# Patient Record
Sex: FEMALE | Race: WHITE | NOT HISPANIC OR LATINO | Employment: PART TIME | URBAN - METROPOLITAN AREA
[De-identification: names, ages, dates, MRNs, and addresses within clinical notes are randomized per-mention and may not be internally consistent; named-entity substitution may affect disease eponyms.]

---

## 2024-01-11 ENCOUNTER — TRANSFERRED RECORDS (OUTPATIENT)
Dept: HEALTH INFORMATION MANAGEMENT | Facility: CLINIC | Age: 19
End: 2024-01-11

## 2024-02-10 ENCOUNTER — HOSPITAL ENCOUNTER (EMERGENCY)
Facility: CLINIC | Age: 19
Discharge: HOME | End: 2024-02-10
Attending: EMERGENCY MEDICINE | Admitting: EMERGENCY MEDICINE
Payer: COMMERCIAL

## 2024-02-10 VITALS
BODY MASS INDEX: 21.34 KG/M2 | OXYGEN SATURATION: 99 % | HEART RATE: 74 BPM | DIASTOLIC BLOOD PRESSURE: 75 MMHG | SYSTOLIC BLOOD PRESSURE: 132 MMHG | WEIGHT: 125 LBS | TEMPERATURE: 98.2 F | RESPIRATION RATE: 16 BRPM | HEIGHT: 64 IN

## 2024-02-10 DIAGNOSIS — R10.9 RECURRENT ABDOMINAL PAIN: ICD-10-CM

## 2024-02-10 LAB
ALBUMIN SERPL BCG-MCNC: 4.6 G/DL (ref 3.5–5.2)
ALBUMIN UR-MCNC: 10 MG/DL
ALP SERPL-CCNC: 37 U/L (ref 40–150)
ALT SERPL W P-5'-P-CCNC: 10 U/L (ref 0–50)
ANION GAP SERPL CALCULATED.3IONS-SCNC: 9 MMOL/L (ref 7–15)
APPEARANCE UR: CLEAR
AST SERPL W P-5'-P-CCNC: 16 U/L (ref 0–45)
BASOPHILS # BLD AUTO: 0 10E3/UL (ref 0–0.2)
BASOPHILS NFR BLD AUTO: 1 %
BILIRUB SERPL-MCNC: 0.6 MG/DL
BILIRUB UR QL STRIP: NEGATIVE
BUN SERPL-MCNC: 11.1 MG/DL (ref 6–20)
CALCIUM SERPL-MCNC: 9.7 MG/DL (ref 8.6–10)
CHLORIDE SERPL-SCNC: 101 MMOL/L (ref 98–107)
COLOR UR AUTO: YELLOW
CREAT SERPL-MCNC: 0.58 MG/DL (ref 0.51–0.95)
DEPRECATED HCO3 PLAS-SCNC: 26 MMOL/L (ref 22–29)
EGFRCR SERPLBLD CKD-EPI 2021: >90 ML/MIN/1.73M2
EOSINOPHIL # BLD AUTO: 0.1 10E3/UL (ref 0–0.7)
EOSINOPHIL NFR BLD AUTO: 2 %
ERYTHROCYTE [DISTWIDTH] IN BLOOD BY AUTOMATED COUNT: 12 % (ref 10–15)
GLUCOSE SERPL-MCNC: 91 MG/DL (ref 70–99)
GLUCOSE UR STRIP-MCNC: NEGATIVE MG/DL
HCG UR QL: NEGATIVE
HCT VFR BLD AUTO: 40.4 % (ref 35–47)
HGB BLD-MCNC: 13.8 G/DL (ref 11.7–15.7)
HGB UR QL STRIP: ABNORMAL
HOLD SPECIMEN: NORMAL
IMM GRANULOCYTES # BLD: 0 10E3/UL
IMM GRANULOCYTES NFR BLD: 0 %
KETONES UR STRIP-MCNC: NEGATIVE MG/DL
LEUKOCYTE ESTERASE UR QL STRIP: ABNORMAL
LYMPHOCYTES # BLD AUTO: 1.7 10E3/UL (ref 0.8–5.3)
LYMPHOCYTES NFR BLD AUTO: 30 %
MCH RBC QN AUTO: 31.1 PG (ref 26.5–33)
MCHC RBC AUTO-ENTMCNC: 34.2 G/DL (ref 31.5–36.5)
MCV RBC AUTO: 91 FL (ref 78–100)
MONOCYTES # BLD AUTO: 0.4 10E3/UL (ref 0–1.3)
MONOCYTES NFR BLD AUTO: 6 %
MUCOUS THREADS #/AREA URNS LPF: PRESENT /LPF
NEUTROPHILS # BLD AUTO: 3.5 10E3/UL (ref 1.6–8.3)
NEUTROPHILS NFR BLD AUTO: 61 %
NITRATE UR QL: NEGATIVE
NRBC # BLD AUTO: 0 10E3/UL
NRBC BLD AUTO-RTO: 0 /100
PH UR STRIP: 6 [PH] (ref 5–7)
PLATELET # BLD AUTO: 317 10E3/UL (ref 150–450)
POTASSIUM SERPL-SCNC: 4 MMOL/L (ref 3.4–5.3)
PROT SERPL-MCNC: 8.1 G/DL (ref 6.4–8.3)
RBC # BLD AUTO: 4.44 10E6/UL (ref 3.8–5.2)
RBC URINE: 7 /HPF
SODIUM SERPL-SCNC: 136 MMOL/L (ref 135–145)
SP GR UR STRIP: 1.03 (ref 1–1.03)
SQUAMOUS EPITHELIAL: 7 /HPF
UROBILINOGEN UR STRIP-MCNC: NORMAL MG/DL
WBC # BLD AUTO: 5.7 10E3/UL (ref 4–11)
WBC URINE: 14 /HPF

## 2024-02-10 PROCEDURE — 85025 COMPLETE CBC W/AUTO DIFF WBC: CPT | Performed by: EMERGENCY MEDICINE

## 2024-02-10 PROCEDURE — 96375 TX/PRO/DX INJ NEW DRUG ADDON: CPT

## 2024-02-10 PROCEDURE — 250N000013 HC RX MED GY IP 250 OP 250 PS 637: Performed by: EMERGENCY MEDICINE

## 2024-02-10 PROCEDURE — 36415 COLL VENOUS BLD VENIPUNCTURE: CPT | Performed by: EMERGENCY MEDICINE

## 2024-02-10 PROCEDURE — 96374 THER/PROPH/DIAG INJ IV PUSH: CPT

## 2024-02-10 PROCEDURE — 99284 EMERGENCY DEPT VISIT MOD MDM: CPT | Mod: 25

## 2024-02-10 PROCEDURE — 87086 URINE CULTURE/COLONY COUNT: CPT | Performed by: EMERGENCY MEDICINE

## 2024-02-10 PROCEDURE — 81001 URINALYSIS AUTO W/SCOPE: CPT | Performed by: EMERGENCY MEDICINE

## 2024-02-10 PROCEDURE — 80053 COMPREHEN METABOLIC PANEL: CPT | Performed by: EMERGENCY MEDICINE

## 2024-02-10 PROCEDURE — 250N000011 HC RX IP 250 OP 636: Performed by: EMERGENCY MEDICINE

## 2024-02-10 PROCEDURE — 81025 URINE PREGNANCY TEST: CPT | Performed by: EMERGENCY MEDICINE

## 2024-02-10 RX ORDER — MAGNESIUM HYDROXIDE/ALUMINUM HYDROXICE/SIMETHICONE 120; 1200; 1200 MG/30ML; MG/30ML; MG/30ML
15 SUSPENSION ORAL ONCE
Status: COMPLETED | OUTPATIENT
Start: 2024-02-10 | End: 2024-02-10

## 2024-02-10 RX ORDER — ONDANSETRON 2 MG/ML
4 INJECTION INTRAMUSCULAR; INTRAVENOUS ONCE
Status: COMPLETED | OUTPATIENT
Start: 2024-02-10 | End: 2024-02-10

## 2024-02-10 RX ORDER — KETOROLAC TROMETHAMINE 15 MG/ML
15 INJECTION, SOLUTION INTRAMUSCULAR; INTRAVENOUS ONCE
Status: COMPLETED | OUTPATIENT
Start: 2024-02-10 | End: 2024-02-10

## 2024-02-10 RX ORDER — OXYCODONE HYDROCHLORIDE 5 MG/1
5 TABLET ORAL ONCE
Status: COMPLETED | OUTPATIENT
Start: 2024-02-10 | End: 2024-02-10

## 2024-02-10 RX ORDER — PREDNISONE 20 MG/1
20 TABLET ORAL DAILY
Qty: 4 TABLET | Refills: 0 | Status: SHIPPED | OUTPATIENT
Start: 2024-02-10 | End: 2024-02-14

## 2024-02-10 RX ADMIN — OXYCODONE HYDROCHLORIDE 5 MG: 5 TABLET ORAL at 11:11

## 2024-02-10 RX ADMIN — KETOROLAC TROMETHAMINE 15 MG: 15 INJECTION, SOLUTION INTRAMUSCULAR; INTRAVENOUS at 11:11

## 2024-02-10 RX ADMIN — ONDANSETRON 4 MG: 2 INJECTION INTRAMUSCULAR; INTRAVENOUS at 10:55

## 2024-02-10 RX ADMIN — FAMOTIDINE 20 MG: 10 INJECTION, SOLUTION INTRAVENOUS at 11:01

## 2024-02-10 RX ADMIN — ALUMINUM HYDROXIDE, MAGNESIUM HYDROXIDE, AND SIMETHICONE 15 ML: 200; 200; 20 SUSPENSION ORAL at 10:55

## 2024-02-10 ASSESSMENT — ACTIVITIES OF DAILY LIVING (ADL)
ADLS_ACUITY_SCORE: 35
ADLS_ACUITY_SCORE: 35

## 2024-02-10 NOTE — ED PROVIDER NOTES
"  History     Chief Complaint:  Abdominal Pain       HPI   Yen Danielle is a 18 year old female presents to the emergency room with abdominal pain. The patient states that she has had flares of abdominal pain since late November. She reports that her pain can be a 3/10 and when it spies it is an 8/10. When her pain spikes it will last a few seconds to a few minuets. After a flare her stool will lose with mucus. When her pain is dull she is typically constipated. She adds when her pain is dull she also feels dizzy. She has been taking acetaminophen to help with the pain. She denies vomiting but has been close to it a few times. Her family does not have a a history of GI issues. She is waiting for a follow up and results from her endoscopy.       Independent Historian:    None    Review of External Notes:  None    Medications:    Ferrous sulfate   Microgestinfe   Proctosol   Zofran   Bentyl   Prilosec    Past Medical History:    No active problems     Past Surgical History:    Adenoidectomy  Tympanostomy tube replacement       Physical Exam   Patient Vitals for the past 24 hrs:   BP Temp Temp src Pulse Resp SpO2 Height Weight   02/10/24 0955 132/75 98.2  F (36.8  C) Oral 74 16 99 % 1.626 m (5' 4\") 56.7 kg (125 lb)        Physical Exam  Constitutional: Alert, attentive, GCS 15     Eyes: EOM are normal, anicteric, conjugate gaze  CV: distal extremities warm, well perfused  Chest: Non-labored breathing on RA  GI: Generalized tenderness. No distension. No guarding or rebound.    Neurological: Alert, attentive, moving all extremities equally.   Skin: Skin is warm and dry.      Emergency Department Course         Laboratory:  Labs Ordered and Resulted from Time of ED Arrival to Time of ED Departure   ROUTINE UA WITH MICROSCOPIC REFLEX TO CULTURE - Abnormal       Result Value    Color Urine Yellow      Appearance Urine Clear      Glucose Urine Negative      Bilirubin Urine Negative      Ketones Urine Negative      " Specific Gravity Urine 1.029      Blood Urine Trace (*)     pH Urine 6.0      Protein Albumin Urine 10 (*)     Urobilinogen Urine Normal      Nitrite Urine Negative      Leukocyte Esterase Urine Moderate (*)     Mucus Urine Present (*)     RBC Urine 7 (*)     WBC Urine 14 (*)     Squamous Epithelials Urine 7 (*)    COMPREHENSIVE METABOLIC PANEL - Abnormal    Sodium 136      Potassium 4.0      Carbon Dioxide (CO2) 26      Anion Gap 9      Urea Nitrogen 11.1      Creatinine 0.58      GFR Estimate >90      Calcium 9.7      Chloride 101      Glucose 91      Alkaline Phosphatase 37 (*)     AST 16      ALT 10      Protein Total 8.1      Albumin 4.6      Bilirubin Total 0.6     HCG QUALITATIVE URINE - Normal    hCG Urine Qualitative Negative     CBC WITH PLATELETS AND DIFFERENTIAL    WBC Count 5.7      RBC Count 4.44      Hemoglobin 13.8      Hematocrit 40.4      MCV 91      MCH 31.1      MCHC 34.2      RDW 12.0      Platelet Count 317      % Neutrophils 61      % Lymphocytes 30      % Monocytes 6      % Eosinophils 2      % Basophils 1      % Immature Granulocytes 0      NRBCs per 100 WBC 0      Absolute Neutrophils 3.5      Absolute Lymphocytes 1.7      Absolute Monocytes 0.4      Absolute Eosinophils 0.1      Absolute Basophils 0.0      Absolute Immature Granulocytes 0.0      Absolute NRBCs 0.0     URINE CULTURE        Emergency Department Course & Assessments:      Interventions:  Medications   alum & mag hydroxide-simethicone (MAALOX) suspension 15 mL (15 mLs Oral $Given 2/10/24 1055)   famotidine (PEPCID) injection 20 mg (20 mg Intravenous $Given 2/10/24 1101)   ondansetron (ZOFRAN) injection 4 mg (4 mg Intravenous $Given 2/10/24 1055)   oxyCODONE (ROXICODONE) tablet 5 mg (5 mg Oral $Given 2/10/24 1111)   ketorolac (TORADOL) injection 15 mg (15 mg Intravenous $Given 2/10/24 1111)        Assessments:  1100 I obtained history and examined the patient as noted above.   1316 I obtained history and examined the patient as  noted above.     Independent Interpretation (X-rays, CTs, rhythm strip):  None    Consultations/Discussion of Management or Tests:  None       Social Determinants of Health affecting care:  NOne     Disposition:  The patient was discharged to home.     Impression & Plan      Medical Decision Makin-year-old woman presenting for repeat evaluation of recurrent abdominal pain.  This has been well evaluated with ultrasounds, CT images, she is due to follow-up with GI as an outpatient to get the results of her biopsies.  She denies any new changes to this, denies any vaginal or urinary symptoms.  I do not see any utility in repeat imaging, screening labs are unremarkable.  With the above medicine she has did not feel significant improved.  She was interested in a trial of steroids given the biopsy showed some nonspecific inflammation, I did review with her that I will start only low dose steroids as she is due to follow-up with GI later this week for results review.  Return precautions reviewed and she was discharged.      Diagnosis:    ICD-10-CM    1. Recurrent abdominal pain  R10.9            Discharge Medications:  Discharge Medication List as of 2/10/2024  1:45 PM        START taking these medications    Details   predniSONE (DELTASONE) 20 MG tablet Take 1 tablet (20 mg) by mouth daily for 4 days, Disp-4 tablet, R-0, Local Print            Kuldeep Centeno MD  [employer]  4:20 PM 02/10/24     A gallon a may be bacteremia    Scribe Disclosure:  Cynthia GLASGOW, am serving as a scribe at 11:09 AM on 2/10/2024 to document services personally performed by Kuldeep Centeno MD based on my observations and the provider's statements to me.  2/10/2024   Kuldeep Centeno MD Dunbar, John Forrest, MD  02/10/24 7120

## 2024-02-10 NOTE — ED TRIAGE NOTES
"Has been to multiple EDs and seen GI for chronic ab pain but states not results have been conclusive.  No change in type of pain.  Feels like energy is \"being sucked to my stomach.\"      Triage Assessment (Adult)       Row Name 02/10/24 0958          Triage Assessment    Airway WDL WDL        Respiratory WDL    Respiratory WDL WDL        Skin Circulation/Temperature WDL    Skin Circulation/Temperature WDL WDL        Cardiac WDL    Cardiac WDL WDL        Peripheral/Neurovascular WDL    Peripheral Neurovascular WDL WDL        Cognitive/Neuro/Behavioral WDL    Cognitive/Neuro/Behavioral WDL WDL                     "

## 2024-02-10 NOTE — DISCHARGE INSTRUCTIONS
I would continue to take Tylenol, Pepcid or Prilosec and you can try the steroids to have associated pain.  Would follow-up with GI as you have planned.  She also underwent foot with your primary doctor for recheck.  Return here to develop worsening pain, have black tarry stools or vomit blood.

## 2024-02-11 LAB
BACTERIA UR CULT: ABNORMAL
BACTERIA UR CULT: ABNORMAL

## 2024-03-12 ENCOUNTER — TRANSFERRED RECORDS (OUTPATIENT)
Dept: HEALTH INFORMATION MANAGEMENT | Facility: CLINIC | Age: 19
End: 2024-03-12

## 2024-03-21 ENCOUNTER — HOSPITAL ENCOUNTER (OUTPATIENT)
Dept: CT IMAGING | Facility: CLINIC | Age: 19
Discharge: HOME | End: 2024-03-21
Attending: INTERNAL MEDICINE | Admitting: INTERNAL MEDICINE
Payer: COMMERCIAL

## 2024-03-21 DIAGNOSIS — R10.84 ABDOMINAL PAIN, GENERALIZED: ICD-10-CM

## 2024-03-21 DIAGNOSIS — K59.09 OTHER CONSTIPATION: ICD-10-CM

## 2024-03-21 PROCEDURE — 250N000009 HC RX 250: Performed by: INTERNAL MEDICINE

## 2024-03-21 PROCEDURE — 74177 CT ABD & PELVIS W/CONTRAST: CPT

## 2024-03-21 PROCEDURE — 250N000011 HC RX IP 250 OP 636: Performed by: INTERNAL MEDICINE

## 2024-03-21 RX ORDER — IOPAMIDOL 755 MG/ML
62 INJECTION, SOLUTION INTRAVASCULAR ONCE
Status: COMPLETED | OUTPATIENT
Start: 2024-03-21 | End: 2024-03-21

## 2024-03-21 RX ADMIN — SODIUM CHLORIDE 58 ML: 9 INJECTION, SOLUTION INTRAVENOUS at 07:47

## 2024-03-21 RX ADMIN — IOPAMIDOL 62 ML: 755 INJECTION, SOLUTION INTRAVENOUS at 07:47

## 2024-04-13 ENCOUNTER — HEALTH MAINTENANCE LETTER (OUTPATIENT)
Age: 19
End: 2024-04-13

## 2024-08-19 ENCOUNTER — MEDICAL CORRESPONDENCE (OUTPATIENT)
Dept: HEALTH INFORMATION MANAGEMENT | Facility: CLINIC | Age: 19
End: 2024-08-19

## 2024-10-24 ENCOUNTER — TRANSFERRED RECORDS (OUTPATIENT)
Dept: HEALTH INFORMATION MANAGEMENT | Facility: CLINIC | Age: 19
End: 2024-10-24
Payer: COMMERCIAL

## 2024-10-30 ENCOUNTER — TELEPHONE (OUTPATIENT)
Dept: GASTROENTEROLOGY | Facility: CLINIC | Age: 19
End: 2024-10-30
Payer: COMMERCIAL

## 2024-10-30 NOTE — TELEPHONE ENCOUNTER
Patient needs 2nd opinion (possible crohn's?) Patient sent in CHETAN to [facility] release of records on 10/28. Patient is having increased pain and is very anxious to get an appt made. Please call the patient at number in demographics.

## 2024-11-05 NOTE — TELEPHONE ENCOUNTER
PALLAVI attempted to reach out to patient to inform her of the following.      This patient does not have a referral. The patient needs a referral from her PCP or current GI provider.    Thank you,  Alannah Burleson RN  GI Care Coordinator     Patient did not answer the phone. PALLAVI left VM instructing patient to ask PCP to send referral to [facility] in regards her symptoms. That way, we can move forward with her request to schedule a visit with one of our providers. [facility] encouraged patient to call back with any questions and concerns. [facility] provided with call back number.      Gabriela You, PALLAVI

## 2024-11-06 ENCOUNTER — TRANSCRIBE ORDERS (OUTPATIENT)
Dept: OTHER | Age: 19
End: 2024-11-06

## 2024-11-06 DIAGNOSIS — K90.0 CELIAC DISEASE: Primary | ICD-10-CM

## 2024-11-06 DIAGNOSIS — R10.9 ABDOMINAL PAIN, UNSPECIFIED ABDOMINAL LOCATION: ICD-10-CM

## 2024-11-06 DIAGNOSIS — K50.919 CROHN'S DISEASE WITH COMPLICATION, UNSPECIFIED GASTROINTESTINAL TRACT LOCATION (H): ICD-10-CM

## 2024-11-06 NOTE — TELEPHONE ENCOUNTER
Received request to call patient due to confusion over referral process. Instructed patient that she would need a referral from PCP or GI provider. Once that referral is received patient will be contacted to schedule appointment.

## 2024-11-07 ENCOUNTER — TELEPHONE (OUTPATIENT)
Dept: GASTROENTEROLOGY | Facility: CLINIC | Age: 19
End: 2024-11-07
Payer: COMMERCIAL

## 2024-12-04 ENCOUNTER — MYC MEDICAL ADVICE (OUTPATIENT)
Dept: GASTROENTEROLOGY | Facility: CLINIC | Age: 19
End: 2024-12-04
Payer: COMMERCIAL

## 2024-12-09 ENCOUNTER — MEDICAL CORRESPONDENCE (OUTPATIENT)
Dept: HEALTH INFORMATION MANAGEMENT | Facility: CLINIC | Age: 19
End: 2024-12-09
Payer: COMMERCIAL

## 2024-12-10 ENCOUNTER — TRANSCRIBE ORDERS (OUTPATIENT)
Dept: OTHER | Age: 19
End: 2024-12-10

## 2024-12-10 ENCOUNTER — TRANSFERRED RECORDS (OUTPATIENT)
Dept: HEALTH INFORMATION MANAGEMENT | Facility: CLINIC | Age: 19
End: 2024-12-10
Payer: COMMERCIAL

## 2024-12-10 DIAGNOSIS — K50.90 CROHN'S DISEASE (H): Primary | ICD-10-CM

## 2024-12-23 NOTE — TELEPHONE ENCOUNTER
REFERRAL INFORMATION:  Referring Provider:  Atul Bonilla MD   Referring Clinic:  [facility]   Reason for Visit/Diagnosis: K50.90 (ICD-10-CM) - Crohn's disease (H) , Possible small bowel crohn's      FUTURE VISIT INFORMATION:  Appointment Date: 1/22/25     NOTES STATUS DETAILS   OFFICE NOTE from Referring Provider Care Everywhere 12/9/24, 10/24/24   OFFICE NOTE from Other Specialist Care Everywhere 8/19/24-Dr. Jones-[facility] DISCHARGE SUMMARY/  ED VISITS Care Everywhere ED 12/30/23-Dr. Paz-[facility]   PROCEDURES     ENDOSCOPY  Care Everywhere-[facility] 1/11/24, Capsule 3/12/24    [facility]   COLONOSCOPY Care Everywhere-[facility] 1/11/24   STOOL TESTING     LABS     PERTINENT LABS Care Everywhere    PATHOLOGY REPORTS (RELATED) Care Everywhere [facility] EGD and Colonoscopy 1/11/24   IMAGES     CT Internal 3/21/24-CT abd pel    [facility]-11/28/23-CT abdomen pelvis   ULTRASOUND In process-in PACS [facility]-1/4/24-US abdomen     Records Requested     December 23, 2024 11:52 AM  [email or web address]   Facility  Critical access hospital   Outcome Requested images

## 2025-01-22 ENCOUNTER — PRE VISIT (OUTPATIENT)
Dept: GASTROENTEROLOGY | Facility: CLINIC | Age: 20
End: 2025-01-22

## 2025-01-22 ENCOUNTER — OFFICE VISIT (OUTPATIENT)
Dept: GASTROENTEROLOGY | Facility: CLINIC | Age: 20
End: 2025-01-22
Attending: INTERNAL MEDICINE
Payer: COMMERCIAL

## 2025-01-22 VITALS
HEART RATE: 85 BPM | OXYGEN SATURATION: 96 % | HEIGHT: 64 IN | SYSTOLIC BLOOD PRESSURE: 106 MMHG | DIASTOLIC BLOOD PRESSURE: 71 MMHG | WEIGHT: 132.8 LBS | BODY MASS INDEX: 22.67 KG/M2

## 2025-01-22 DIAGNOSIS — R53.83 OTHER FATIGUE: ICD-10-CM

## 2025-01-22 DIAGNOSIS — K52.9 ILEITIS: Primary | ICD-10-CM

## 2025-01-22 RX ORDER — MESALAMINE 1.2 G/1
2400 TABLET, DELAYED RELEASE ORAL
COMMUNITY

## 2025-01-22 RX ORDER — FAMOTIDINE 10 MG
10 TABLET ORAL DAILY
COMMUNITY

## 2025-01-22 RX ORDER — NORGESTIMATE AND ETHINYL ESTRADIOL 0.25-0.035
1 KIT ORAL DAILY
COMMUNITY

## 2025-01-22 ASSESSMENT — PAIN SCALES - GENERAL: PAINLEVEL_OUTOF10: MILD PAIN (3)

## 2025-01-22 NOTE — PROGRESS NOTES
IBD CLINIC VISIT    CC/REFERRING MD:  Atul Bonilla  REASON FOR CONSULTATION: Crohn's disease and Celiac disease.       ASSESSMENT/PLAN:  Yen Danielle is a 19 year old with history of Celiac disease and abdominal pain with acute ileitis on previous colonoscopy. Presenting for second opinion visit for question of Crohn's disease.     # Question of Crohn's disease in setting of acute ileitis on TI biopsies  # Chronic abdominal pain  # Alternating bowel pattern   Patient has been dealing with GI symptoms of abdominal pain and fluctuating bowel pattern which started in late 2023. She has workup with another GI group for symptoms with largely normal workup (EGD, colon, capsule endoscopy, CT). Her colonoscopy was endoscopically normal including normal appearing ileum however biopsies showed focal acute inflammation. Capsule endoscopy was unremarkable following this.  Due to biopsy results and ongoing symptoms, she was trialed on steroids and mesalamine products with improvement in symptoms.   Today we discussed that she does not have any evidence of Crohn's disease at this time. NSAID use prior to onset of GI symptoms could explain acute ileitis. She has avoided NSAIDs since 11/2023. We will plan to evaluate with MRE and repeat colonoscopy. If these are negative, her symptoms likely fit with IBS/functional GI disorder which we discussed.    - Labs today: CBC, CMP, CRP, B12, TSH, iron studies, TTGs  - Request pathology slides for review with our pathology team.   - MR enterography to evaluate for small bowel inflammation   - Repeat colonoscopy with Dr. Mccain.   - Start fiber supplement (citrucel, metamucil, benefiber) to help even out bowel movements         Patient seen and discussed with Dr. Adán Cardoza MD   GI fellow       HPI:   19 year old with history of Celiac disease and abdominal pain with acute ileitis on previous colonoscopy. Presenting for second opinion visit for question of Crohn's disease.      Yen notes she has had ongoing issues with abdominal pain since 11/2023 which started without clear trigger. This lead to workup of with fecal calprotectin of 250 and positive fit test.  She was seen at outside GI clinic and had EGD and colonoscopy.  EGD was noted to be normal with biopsies negative for H. pylori and celiac disease.  Colonoscopy showed normal terminal ileum and colon however biopsies of terminal ileum showed acute focal inflammation.  Negative random colon biopsies.  She later presented to ED and was given prednisone for ileitis and question of Crohn's disease. Notes that her abdominal pain and loose stools resolved while on prednisone.     She has been following with outside GI group who have not given her a diagnosis of Crohn's but have tried mesalamine and budesonide for symptoms. She has capsule endoscopy in 2024 which was unremarkable. She notes feeling better on these meds, specifically Pentasa. Due to a shortage in Pentasa she is now on Lialda and not finding much benefit. She has been off of budesonide for 1-2 months.     She notes alternating stooling pattern going 3 days without a bowel movement and then will have 1 day of multiple loose stools up to 5/day.  That has 1 to 2 days of regular stools before returning to constipation.  She reports trying to focus on a high-fiber diet but has not tried any fiber supplements.    Regarding her abdominal pain she notes that initially started on her right side but over the last few months she has noted left and right sided abdominal pain as well as intermittent chest pain.  Constant dull pain on the right side of her abdomen with intermittent episodes of sharp abdominal pain in her right and left sides.  Does not seem to be associated with eating or bowel movements. No improvement with Bentyl. Also notes extreme fatigue.     Of note before she started having abdominal pain in November 2023 she was taking NSAIDs on a regular basis for headaches  that she would get a few days of the week.  On these days she would take NSAIDs every 4 hours.  She has avoided these since GI symptoms started in late 2023.          PERTINENT PAST MEDICAL HISTORY:  Celiac disease   Anxiety   Ruptured ovarian cyst at age 13    PREVIOUS SURGERIES:  None         ALLERGIES:     Allergies   Allergen Reactions    Gluten Meal GI Disturbance and Other (See Comments)     Celiac disease       PERTINENT MEDICATIONS:    Current Outpatient Medications:     famotidine (PEPCID) 10 MG tablet, Take 10 mg by mouth daily., Disp: , Rfl:     mesalamine (LIALDA) 1.2 g DR tablet, Take 2,400 mg by mouth daily (with breakfast). The patient takes from 2400 mg-3600 mg daily depending on pain condition, Disp: , Rfl:     norgestimate-ethinyl estradiol (ORTHO-CYCLEN) 0.25-35 MG-MCG tablet, Take 1 tablet by mouth daily., Disp: , Rfl:     SOCIAL HISTORY:  Social History     Socioeconomic History    Marital status: Single     Spouse name: Not on file    Number of children: Not on file    Years of education: Not on file    Highest education level: Not on file   Occupational History    Not on file   Tobacco Use    Smoking status: Never    Smokeless tobacco: Never   Substance and Sexual Activity    Alcohol use: Not on file    Drug use: Not on file    Sexual activity: Not on file   Other Topics Concern    Not on file   Social History Narrative    Not on file     Social Drivers of Health     Financial Resource Strain: Not on file   Food Insecurity: Not on file   Transportation Needs: Not on file   Physical Activity: Not on file   Stress: Not on file   Social Connections: Not on file   Interpersonal Safety: Not on file   Housing Stability: Not on file       FAMILY HISTORY:  No family history on file.    Past/family/social history reviewed and no changes    PHYSICAL EXAMINATION:  Constitutional: aaox3, cooperative, pleasant, not dyspneic/diaphoretic, no acute distress  Vitals reviewed: /71   Pulse 85   Ht 1.626  "m (5' 4\")   Wt 60.2 kg (132 lb 12.8 oz)   SpO2 96%   BMI 22.80 kg/m    Wt:   Wt Readings from Last 2 Encounters:   01/22/25 60.2 kg (132 lb 12.8 oz)   02/10/24 56.7 kg (125 lb)      Eyes: Sclera anicteric/injected  Ears/nose/mouth/throat: Normal oropharynx without ulcers or exudate, mucus membranes moist, hearing intact  CV: No edema  Respiratory: Unlabored breathing  Abd:  mild tenderness throughout, soft, and nondistended. Positive carnett's sign   Skin: warm, perfused, no jaundice  Psych: Normal affect  MSK: Normal gait      PERTINENT STUDIES:  Most recent CBC:  Recent Labs   Lab Test 02/10/24  1051   WBC 5.7   HGB 13.8   HCT 40.4        Most recent hepatic panel:  Recent Labs   Lab Test 02/10/24  1051   ALT 10   AST 16     Most recent creatinine:  Recent Labs   Lab Test 02/10/24  1051   CR 0.58         PREVIOUS ENDOSCOPY:  Colonoscopy 1/2024 for diarrhea and abdominal pain.  Normal terminal ileum and colon   -Pathology with focal acute inflammation on ileal biopsies.  Negative granulomas or dysplasia.  Normal random colon biopsies    Upper endoscopy 1/2024 normal exam of esophagus, stomach and duodenum   -Mild chronic inactive gastritis on stomach biopsies.  Negative for H. pylori.  Duodenal biopsies with normal mucosa and no increased intraepithelial lymphocytes    Capsule endoscopy 3/2024 with normal small bowel mucosa and no signs of small bowel Crohn's disease.  Small bowel passage time 3 hours 15 minutes     "

## 2025-01-22 NOTE — NURSING NOTE
"Do you have a history of colon cancer in your immediate family? NO    If yes who: negative     And what age  were they diagnosed:       Chief Complaint   Patient presents with    New Patient       Vitals:    01/22/25 0748   BP: 106/71   Pulse: 85   SpO2: 96%   Weight: 60.2 kg (132 lb 12.8 oz)   Height: 1.626 m (5' 4\")       Body mass index is 22.8 kg/m .    Izaiah Perry MA    "

## 2025-01-22 NOTE — LETTER
1/22/2025      Yen Danielle  [address] [facility] [address]      Dear Colleague,    Thank you for referring your patient, Yen Danielle, to the [facility] [place]. Please see a copy of my visit note below.    IBD CLINIC VISIT    CC/REFERRING MD:  Atul Bonilla  REASON FOR CONSULTATION: Crohn's disease and Celiac disease.       ASSESSMENT/PLAN:  Yen Danielle is a 19 year old with history of Celiac disease and abdominal pain with acute ileitis on previous colonoscopy. Presenting for second opinion visit for question of Crohn's disease.     # Question of Crohn's disease in setting of acute ileitis on TI biopsies  # Chronic abdominal pain  # Alternating bowel pattern   Patient has been dealing with GI symptoms of abdominal pain and fluctuating bowel pattern which started in late 2023. She has workup with another GI group for symptoms with largely normal workup (EGD, colon, capsule endoscopy, CT). Her colonoscopy was endoscopically normal including normal appearing ileum however biopsies showed focal acute inflammation. Capsule endoscopy was unremarkable following this.  Due to biopsy results and ongoing symptoms, she was trialed on steroids and mesalamine products with improvement in symptoms.   Today we discussed that she does not have any evidence of Crohn's disease at this time. NSAID use prior to onset of GI symptoms could explain acute ileitis. She has avoided NSAIDs since 11/2023. We will plan to evaluate with MRE and repeat colonoscopy. If these are negative, her symptoms likely fit with IBS/functional GI disorder which we discussed.    - Labs today: CBC, CMP, CRP, B12, TSH, iron studies, TTGs  - Request pathology slides for review with our pathology team.   - MR enterography to evaluate for small bowel inflammation   - Repeat colonoscopy with Dr. Mccain.   - Start fiber supplement (citrucel, metamucil, benefiber) to help even out bowel movements          Patient seen and discussed with Dr. Adán Cardoza MD   GI fellow       HPI:   19 year old with history of Celiac disease and abdominal pain with acute ileitis on previous colonoscopy. Presenting for second opinion visit for question of Crohn's disease.     Yen notes she has had ongoing issues with abdominal pain since 11/2023 which started without clear trigger. This lead to workup of with fecal calprotectin of 250 and positive fit test.  She was seen at outside GI clinic and had EGD and colonoscopy.  EGD was noted to be normal with biopsies negative for H. pylori and celiac disease.  Colonoscopy showed normal terminal ileum and colon however biopsies of terminal ileum showed acute focal inflammation.  Negative random colon biopsies.  She later presented to ED and was given prednisone for ileitis and question of Crohn's disease. Notes that her abdominal pain and loose stools resolved while on prednisone.     She has been following with outside GI group who have not given her a diagnosis of Crohn's but have tried mesalamine and budesonide for symptoms. She has capsule endoscopy in 2024 which was unremarkable. She notes feeling better on these meds, specifically Pentasa. Due to a shortage in Pentasa she is now on Lialda and not finding much benefit. She has been off of budesonide for 1-2 months.     She notes alternating stooling pattern going 3 days without a bowel movement and then will have 1 day of multiple loose stools up to 5/day.  That has 1 to 2 days of regular stools before returning to constipation.  She reports trying to focus on a high-fiber diet but has not tried any fiber supplements.    Regarding her abdominal pain she notes that initially started on her right side but over the last few months she has noted left and right sided abdominal pain as well as intermittent chest pain.  Constant dull pain on the right side of her abdomen with intermittent episodes of sharp abdominal pain  in her right and left sides.  Does not seem to be associated with eating or bowel movements. No improvement with Bentyl. Also notes extreme fatigue.     Of note before she started having abdominal pain in November 2023 she was taking NSAIDs on a regular basis for headaches that she would get a few days of the week.  On these days she would take NSAIDs every 4 hours.  She has avoided these since GI symptoms started in late 2023.          PERTINENT PAST MEDICAL HISTORY:  Celiac disease   Anxiety   Ruptured ovarian cyst at age 13    PREVIOUS SURGERIES:  None         ALLERGIES:     Allergies   Allergen Reactions     Gluten Meal GI Disturbance and Other (See Comments)     Celiac disease       PERTINENT MEDICATIONS:    Current Outpatient Medications:      famotidine (PEPCID) 10 MG tablet, Take 10 mg by mouth daily., Disp: , Rfl:      mesalamine (LIALDA) 1.2 g DR tablet, Take 2,400 mg by mouth daily (with breakfast). The patient takes from 2400 mg-3600 mg daily depending on pain condition, Disp: , Rfl:      norgestimate-ethinyl estradiol (ORTHO-CYCLEN) 0.25-35 MG-MCG tablet, Take 1 tablet by mouth daily., Disp: , Rfl:     SOCIAL HISTORY:  Social History     Socioeconomic History     Marital status: Single     Spouse name: Not on file     Number of children: Not on file     Years of education: Not on file     Highest education level: Not on file   Occupational History     Not on file   Tobacco Use     Smoking status: Never     Smokeless tobacco: Never   Substance and Sexual Activity     Alcohol use: Not on file     Drug use: Not on file     Sexual activity: Not on file   Other Topics Concern     Not on file   Social History Narrative     Not on file     Social Drivers of Health     Financial Resource Strain: Not on file   Food Insecurity: Not on file   Transportation Needs: Not on file   Physical Activity: Not on file   Stress: Not on file   Social Connections: Not on file   Interpersonal Safety: Not on file   Housing  "Stability: Not on file       FAMILY HISTORY:  No family history on file.    Past/family/social history reviewed and no changes    PHYSICAL EXAMINATION:  Constitutional: aaox3, cooperative, pleasant, not dyspneic/diaphoretic, no acute distress  Vitals reviewed: /71   Pulse 85   Ht 1.626 m (5' 4\")   Wt 60.2 kg (132 lb 12.8 oz)   SpO2 96%   BMI 22.80 kg/m    Wt:   Wt Readings from Last 2 Encounters:   01/22/25 60.2 kg (132 lb 12.8 oz)   02/10/24 56.7 kg (125 lb)      Eyes: Sclera anicteric/injected  Ears/nose/mouth/throat: Normal oropharynx without ulcers or exudate, mucus membranes moist, hearing intact  CV: No edema  Respiratory: Unlabored breathing  Abd:  mild tenderness throughout, soft, and nondistended. Positive carnett's sign   Skin: warm, perfused, no jaundice  Psych: Normal affect  MSK: Normal gait      PERTINENT STUDIES:  Most recent CBC:  Recent Labs   Lab Test 02/10/24  1051   WBC 5.7   HGB 13.8   HCT 40.4        Most recent hepatic panel:  Recent Labs   Lab Test 02/10/24  1051   ALT 10   AST 16     Most recent creatinine:  Recent Labs   Lab Test 02/10/24  1051   CR 0.58         PREVIOUS ENDOSCOPY:  Colonoscopy 1/2024 for diarrhea and abdominal pain.  Normal terminal ileum and colon   -Pathology with focal acute inflammation on ileal biopsies.  Negative granulomas or dysplasia.  Normal random colon biopsies    Upper endoscopy 1/2024 normal exam of esophagus, stomach and duodenum   -Mild chronic inactive gastritis on stomach biopsies.  Negative for H. pylori.  Duodenal biopsies with normal mucosa and no increased intraepithelial lymphocytes    Capsule endoscopy 3/2024 with normal small bowel mucosa and no signs of small bowel Crohn's disease.  Small bowel passage time 3 hours 15 minutes       Attestation signed by Maninder Mccain MD at 1/23/2025  1:15 PM:  I performed a history and physical examination of this patient and discussed the management with Moe Melgar MD on Jan " 22, 2025. I reviewed the note and there are no changes to the past medical, family or social history.  A complete 10 point review of systems was obtained. Please see the HPI for pertinent positives and negatives. All other systems were reviewed and were found to be negative. I agree with the documented findings and plan of care as outlined.    We discussed at this time that she did not have enough evidence to diagnose Crohn's disease. Isolated microscopic acute ileitis is not consistent with a diagnosis of Crohn's.     Discussed that feeling better on steroids was not a diagnostic marker of Crohn's either.    Her bloating and fatigue are what is driving her poor quality of life at this point. We discussed that there is a strong mind-body connection and that many of her symptoms may be centrally (functionally) medicated.     We will thoroughly assess for Crohn's in her with a pathology review, MRE, and repeat colonoscopy.     I did discuss if she did not have Crohn's disease, that she would do best with seeing a functional medicine or IBS specialist.     Follow-up with me based on a diagnosis of Crohn's disease or not.     Maninder Mccain MD MS    [facility]  Inflammatory Bowel Disease Program  Division of Gastroenterology, Hepatology and Nutrition       Again, thank you for allowing me to participate in the care of your patient.        Sincerely,        Maninder Mccain MD    Electronically signed

## 2025-01-22 NOTE — PATIENT INSTRUCTIONS
It was a pleasure meeting with you today and discussing your healthcare plan. Below is a summary of what we covered:    -blood work today  - MR enterography (call to schedule  [phone])  - Colonoscopy with Dr. Mccain.       Please see below for any additional questions and scheduling guidelines.    Sign up for [email or web address]: [email or web address] patient portal serves as a secure platform for accessing your medical records from the [facility]. Additionally, [email or web address] facilitates easy, timely, and secure messaging with your care team. If you have not signed up, you may do so by using the provided code or calling [phone].    Coordinating your care after your visit:  There are multiple options for scheduling your follow-up care based on your provider's recommendation.    How do I schedule a follow-up clinic appointment:   After your appointment, you may receive scheduling assistance with the Clinic Coordinators by having a seat in the waiting room and a Clinic Coordinator will call you up to schedule.  Virtual visits or after you leave the clinic:  Your provider has placed a follow-up order in the [email or web address] portal for scheduling your return appointment. A member of the scheduling team will contact you to schedule.  [facility] Scheduling: Timely scheduling through [email or web address] is advised to ensure appointment availability.   Call to schedule: You may schedule your follow-up appointment(s) by calling [phone], option 1.    How do I schedule my endoscopy or colonoscopy procedure:  If a procedure, such as a colonoscopy or upper endoscopy was ordered by your provider, the scheduling team will contact you to schedule this procedure. Or you may choose to call to schedule at   [phone], option 2.  Please allow 20-30 minutes when scheduling a procedure.    How do I get my blood work done? To get your blood work done, you need to schedule a lab appointment at an [facility]. There are multiple ways to schedule:    At the clinic: The Clinic Coordinator you meet after your visit can help you schedule a lab appointment.   [employer] scheduling: [employer] offers online lab scheduling at all [facility] laboratory locations.   Call to schedule: You can call [phone] to schedule your lab appointment.    How do I schedule my imaging study: To schedule imaging studies, such as CT scans, ultrasounds, MRIs, or X-rays, contact Imaging Services at [phone].    How do I schedule a referral to another doctor: If your provider recommended a referral to another specialist(s), the referral order was placed by your provider. You will receive a phone call to schedule this referral, or you may choose to call the number attached to the referral to self-schedule.    For Post-Visit Question(s):  For any inquiries following today's visit:  Please utilize [employer] messaging and allow 48 hours for reply or contact the Call Center during normal business hours at [phone], option 3.  For Emergent After-hours questions, contact the On-Call GI Fellow through the [facility]  at [phone].  In addition, you may contact your Nurse directly using the provided contact information.    Test Results:  Test results will be accessible via [employer] in compliance with the 21st Century Cures Act. This means that your results will be available to you at the same time as your provider. Often you may see your results before your provider does. Results are reviewed by staff within two weeks with communication follow-up. Results may be released in the patient portal prior to your care team review.    Prescription Refill(s):  Medication prescribed by your provider will be addressed during your visit. For future refills, please coordinate with your pharmacy. If you have not had a recent clinic visit or routine labs, for your safety, your provider may not be able to refill your prescription.

## 2025-01-23 ENCOUNTER — DOCUMENTATION ONLY (OUTPATIENT)
Dept: GASTROENTEROLOGY | Facility: CLINIC | Age: 20
End: 2025-01-23
Payer: COMMERCIAL

## 2025-01-23 ENCOUNTER — MYC MEDICAL ADVICE (OUTPATIENT)
Dept: GASTROENTEROLOGY | Facility: CLINIC | Age: 20
End: 2025-01-23
Payer: COMMERCIAL

## 2025-01-24 ENCOUNTER — LAB (OUTPATIENT)
Dept: LAB | Facility: CLINIC | Age: 20
End: 2025-01-24
Payer: COMMERCIAL

## 2025-01-24 DIAGNOSIS — R53.83 OTHER FATIGUE: ICD-10-CM

## 2025-01-24 DIAGNOSIS — K52.9 ILEITIS: ICD-10-CM

## 2025-01-24 LAB
ALBUMIN SERPL BCG-MCNC: 4.4 G/DL (ref 3.5–5.2)
ALP SERPL-CCNC: 50 U/L (ref 40–150)
ALT SERPL W P-5'-P-CCNC: 9 U/L (ref 0–50)
ANION GAP SERPL CALCULATED.3IONS-SCNC: 10 MMOL/L (ref 7–15)
AST SERPL W P-5'-P-CCNC: 18 U/L (ref 0–45)
BILIRUB SERPL-MCNC: 0.4 MG/DL
BUN SERPL-MCNC: 7.4 MG/DL (ref 6–20)
CALCIUM SERPL-MCNC: 9.3 MG/DL (ref 8.8–10.4)
CHLORIDE SERPL-SCNC: 101 MMOL/L (ref 98–107)
CREAT SERPL-MCNC: 0.57 MG/DL (ref 0.51–0.95)
CRP SERPL-MCNC: 3.57 MG/L
EGFRCR SERPLBLD CKD-EPI 2021: >90 ML/MIN/1.73M2
ERYTHROCYTE [DISTWIDTH] IN BLOOD BY AUTOMATED COUNT: 11.8 % (ref 10–15)
FERRITIN SERPL-MCNC: 30 NG/ML (ref 6–175)
GLUCOSE SERPL-MCNC: 92 MG/DL (ref 70–99)
HCO3 SERPL-SCNC: 26 MMOL/L (ref 22–29)
HCT VFR BLD AUTO: 37.8 % (ref 35–47)
HGB BLD-MCNC: 12.8 G/DL (ref 11.7–15.7)
IRON BINDING CAPACITY (ROCHE): 439 UG/DL (ref 240–430)
IRON SATN MFR SERPL: 17 % (ref 15–46)
IRON SERPL-MCNC: 75 UG/DL (ref 37–145)
MCH RBC QN AUTO: 30.5 PG (ref 26.5–33)
MCHC RBC AUTO-ENTMCNC: 33.9 G/DL (ref 31.5–36.5)
MCV RBC AUTO: 90 FL (ref 78–100)
PLATELET # BLD AUTO: 309 10E3/UL (ref 150–450)
POTASSIUM SERPL-SCNC: 4 MMOL/L (ref 3.4–5.3)
PROT SERPL-MCNC: 7.8 G/DL (ref 6.4–8.3)
RBC # BLD AUTO: 4.2 10E6/UL (ref 3.8–5.2)
SODIUM SERPL-SCNC: 137 MMOL/L (ref 135–145)
TSH SERPL DL<=0.005 MIU/L-ACNC: 3.08 UIU/ML (ref 0.3–4.2)
VIT B12 SERPL-MCNC: 482 PG/ML (ref 232–1245)
WBC # BLD AUTO: 5.6 10E3/UL (ref 4–11)

## 2025-01-24 PROCEDURE — 99000 SPECIMEN HANDLING OFFICE-LAB: CPT | Performed by: PATHOLOGY

## 2025-01-24 PROCEDURE — 84443 ASSAY THYROID STIM HORMONE: CPT | Performed by: PATHOLOGY

## 2025-01-24 PROCEDURE — 36415 COLL VENOUS BLD VENIPUNCTURE: CPT | Performed by: PATHOLOGY

## 2025-01-24 PROCEDURE — 83540 ASSAY OF IRON: CPT | Performed by: PATHOLOGY

## 2025-01-24 PROCEDURE — 82607 VITAMIN B-12: CPT | Performed by: STUDENT IN AN ORGANIZED HEALTH CARE EDUCATION/TRAINING PROGRAM

## 2025-01-24 PROCEDURE — 80053 COMPREHEN METABOLIC PANEL: CPT | Performed by: PATHOLOGY

## 2025-01-24 PROCEDURE — 86364 TISS TRNSGLTMNASE EA IG CLAS: CPT | Performed by: STUDENT IN AN ORGANIZED HEALTH CARE EDUCATION/TRAINING PROGRAM

## 2025-01-24 PROCEDURE — 83550 IRON BINDING TEST: CPT | Performed by: PATHOLOGY

## 2025-01-24 PROCEDURE — 82728 ASSAY OF FERRITIN: CPT | Performed by: PATHOLOGY

## 2025-01-24 PROCEDURE — 85027 COMPLETE CBC AUTOMATED: CPT | Performed by: PATHOLOGY

## 2025-01-24 PROCEDURE — 86140 C-REACTIVE PROTEIN: CPT | Performed by: PATHOLOGY

## 2025-01-27 ENCOUNTER — DOCUMENTATION ONLY (OUTPATIENT)
Dept: GASTROENTEROLOGY | Facility: CLINIC | Age: 20
End: 2025-01-27
Payer: COMMERCIAL

## 2025-01-27 NOTE — PROGRESS NOTES
Called to get status on request for path slides , they were mailed. Sent 2nd request just in case .     Shayy REED MA

## 2025-01-29 LAB
TTG IGA SER-ACNC: 0.6 U/ML
TTG IGG SER-ACNC: 1.9 U/ML

## 2025-02-04 DIAGNOSIS — K52.9 ILEITIS: Primary | ICD-10-CM

## 2025-02-04 NOTE — PROGRESS NOTES
Per Dr. Mccain, pathology slides requested and received for patients colonoscopy with Dr. Schroeder[facility]. Order placed for pathology consult.

## 2025-02-05 NOTE — TELEPHONE ENCOUNTER
REFERRAL INFORMATION:  Referring Provider:  Moe Cardoza MD   Referring Clinic:  [facility]STRO[facility]OLOG[facility]   Reason for Visit/Diagnosis: K52.9 (ICD-10-CM) - Ileitis      FUTURE VISIT INFORMATION:  Appointment Date: 5/6/25     NOTES STATUS DETAILS   OFFICE NOTE from Referring Provider Internal 1/22/25   OFFICE NOTE from Other Specialist Care Everywhere [facility] GI-  12/5/24, 10/24/24-Dr. Bonilla    8/19/24-Dr. [facility] DISCHARGE SUMMARY/  ED VISITS Care Everywhere ED 12/30/23-Dr. [address]    MEDICATION LIST Internal    PROCEDURES     ENDOSCOPY  Care Everywhere 1/11/24, Capsule 3/12/24-[facility] GI     [facility]-3/25/24   COLONOSCOPY Care Everywhere [facility] GI-1/11/24    STOOL TESTING     LABS     PERTINENT LABS Internal    PATHOLOGY REPORTS (RELATED) Care Everywhere [facility] EGD and Colonoscopy 1/11/24    IMAGES     CT Internal 3/21/24-CT abd pel     11/28/23-CT abdomen pelvis   ULTRASOUND Internal 1/4/24-US abdomen

## 2025-02-07 PROCEDURE — 88323 CONSLTJ&REPRT MATRL PREP SLD: CPT | Mod: TC | Performed by: INTERNAL MEDICINE

## 2025-02-07 PROCEDURE — 88321 CONSLTJ&REPRT SLD PREP ELSWR: CPT | Performed by: PATHOLOGY

## 2025-02-10 LAB
PATH REPORT.COMMENTS IMP SPEC: NORMAL
PATH REPORT.FINAL DX SPEC: NORMAL
PATH REPORT.GROSS SPEC: NORMAL
PATH REPORT.MICROSCOPIC SPEC OTHER STN: NORMAL
PATH REPORT.RELEVANT HX SPEC: NORMAL
PATH REPORT.RELEVANT HX SPEC: NORMAL
PATH REPORT.SITE OF ORIGIN SPEC: NORMAL

## 2025-03-18 ENCOUNTER — TELEPHONE (OUTPATIENT)
Dept: GASTROENTEROLOGY | Facility: CLINIC | Age: 20
End: 2025-03-18
Payer: COMMERCIAL

## 2025-03-18 NOTE — TELEPHONE ENCOUNTER
Patient Contacted and rescheduled the following:    Appointment type: New  Provider:   Return date: 6/3      Resched 5/6 to 6/3 clinic due to provider unavil     3/18 AA

## 2025-04-19 ENCOUNTER — HEALTH MAINTENANCE LETTER (OUTPATIENT)
Age: 20
End: 2025-04-19

## 2025-05-06 ENCOUNTER — PRE VISIT (OUTPATIENT)
Dept: GASTROENTEROLOGY | Facility: CLINIC | Age: 20
End: 2025-05-06